# Patient Record
Sex: FEMALE | Race: WHITE | HISPANIC OR LATINO | Employment: UNEMPLOYED | URBAN - METROPOLITAN AREA
[De-identification: names, ages, dates, MRNs, and addresses within clinical notes are randomized per-mention and may not be internally consistent; named-entity substitution may affect disease eponyms.]

---

## 2018-06-06 ENCOUNTER — HOSPITAL ENCOUNTER (EMERGENCY)
Facility: HOSPITAL | Age: 68
Discharge: HOME | End: 2018-06-06
Attending: EMERGENCY MEDICINE
Payer: MEDICARE

## 2018-06-06 VITALS
HEART RATE: 76 BPM | HEIGHT: 65 IN | DIASTOLIC BLOOD PRESSURE: 83 MMHG | OXYGEN SATURATION: 97 % | WEIGHT: 150 LBS | BODY MASS INDEX: 24.99 KG/M2 | RESPIRATION RATE: 16 BRPM | TEMPERATURE: 99 F | SYSTOLIC BLOOD PRESSURE: 152 MMHG

## 2018-06-06 DIAGNOSIS — M54.50 ACUTE BILATERAL LOW BACK PAIN WITHOUT SCIATICA: Primary | ICD-10-CM

## 2018-06-06 LAB
BILIRUB UR QL STRIP: NEGATIVE
CLARITY UR: CLEAR
COLOR UR: YELLOW
GLUCOSE UR QL STRIP: NEGATIVE
HGB UR QL STRIP: NEGATIVE
KETONES UR QL STRIP: NEGATIVE
LEUKOCYTE ESTERASE UR QL STRIP: NEGATIVE
NITRITE UR QL STRIP: NEGATIVE
PH UR STRIP: 6 [PH] (ref 5–8)
PROT UR QL STRIP: NEGATIVE
SP GR UR STRIP: 1.02 (ref 1–1.03)
URN SPEC COLLECT METH UR: NORMAL
UROBILINOGEN UR STRIP-ACNC: NEGATIVE EU/DL

## 2018-06-06 PROCEDURE — 96372 THER/PROPH/DIAG INJ SC/IM: CPT

## 2018-06-06 PROCEDURE — 99283 EMERGENCY DEPT VISIT LOW MDM: CPT | Mod: 25

## 2018-06-06 PROCEDURE — 63600175 PHARM REV CODE 636 W HCPCS: Performed by: EMERGENCY MEDICINE

## 2018-06-06 PROCEDURE — 81003 URINALYSIS AUTO W/O SCOPE: CPT

## 2018-06-06 RX ORDER — ORPHENADRINE CITRATE 30 MG/ML
60 INJECTION INTRAMUSCULAR; INTRAVENOUS
Status: COMPLETED | OUTPATIENT
Start: 2018-06-06 | End: 2018-06-06

## 2018-06-06 RX ORDER — CYCLOBENZAPRINE HCL 10 MG
10 TABLET ORAL 3 TIMES DAILY PRN
Qty: 14 TABLET | Refills: 0 | Status: SHIPPED | OUTPATIENT
Start: 2018-06-06 | End: 2018-06-11

## 2018-06-06 RX ORDER — KETOROLAC TROMETHAMINE 30 MG/ML
30 INJECTION, SOLUTION INTRAMUSCULAR; INTRAVENOUS
Status: COMPLETED | OUTPATIENT
Start: 2018-06-06 | End: 2018-06-06

## 2018-06-06 RX ADMIN — KETOROLAC TROMETHAMINE 30 MG: 30 INJECTION, SOLUTION INTRAMUSCULAR at 11:06

## 2018-06-06 RX ADMIN — ORPHENADRINE CITRATE 60 MG: 30 INJECTION INTRAMUSCULAR; INTRAVENOUS at 11:06

## 2018-06-07 NOTE — DISCHARGE INSTRUCTIONS
In the morning, I recommend you begin taking ibuprofen 600mg every 8 hours with food until your symptoms improve.

## 2018-06-07 NOTE — ED PROVIDER NOTES
Encounter Date: 6/6/2018       History     Chief Complaint   Patient presents with    Back Pain     onset 5 days after pushing a box ambulatory to [facility]     68-year-old female presents emergency department complaining of several days of low back pain. She reports an aching, constant low back pain that started about 4 5 days ago when she was pushing something.  States she believes she pulled a muscle.  Reports the pain is constant but worse with certain movements and positions and without alleviating factors.  Denies any focal numbness or weakness or loss of continence or difficulty urinating or defecating.  No recent falls reported.  No other symptoms reported.          Review of patient's allergies indicates:  No Known Allergies  Past Medical History:   Diagnosis Date    Hypertension     Kidney stones      Past Surgical History:   Procedure Laterality Date    HYSTERECTOMY       Family History   Problem Relation Age of Onset    Diabetes Mother     Heart disease Father     Hypertension Sister     Hypertension Brother     Cancer Maternal Grandfather      Social History   Substance Use Topics    Smoking status: Never Smoker    Smokeless tobacco: Not on file    Alcohol use No     Review of Systems   Constitutional: Negative for chills, fatigue and fever.   HENT: Negative for congestion, sore throat and voice change.    Eyes: Negative for photophobia, pain and redness.   Respiratory: Negative for cough, choking and shortness of breath.    Cardiovascular: Negative for chest pain, palpitations and leg swelling.   Gastrointestinal: Negative for abdominal pain, diarrhea, nausea and vomiting.   Genitourinary: Negative for dysuria, frequency and urgency.   Musculoskeletal: Positive for back pain. Negative for neck pain and neck stiffness.   Neurological: Negative for seizures, speech difficulty, light-headedness, numbness and headaches.   All other systems reviewed and are negative.      Physical Exam     Initial  Vitals [06/06/18 2219]   BP Pulse Resp Temp SpO2   (!) 152/83 76 16 98.8 °F (37.1 °C) 97 %      MAP       106         Physical Exam    Nursing note and vitals reviewed.  Constitutional: She appears well-developed and well-nourished. She appears distressed (Mild discomfort).   HENT:   Head: Normocephalic and atraumatic.   Mouth/Throat: Oropharynx is clear and moist.   Eyes: Conjunctivae and EOM are normal. Pupils are equal, round, and reactive to light.   Neck: Normal range of motion. Neck supple. No tracheal deviation present.   Cardiovascular: Normal rate, regular rhythm, normal heart sounds and intact distal pulses.   Pulmonary/Chest: Breath sounds normal. No respiratory distress. She has no wheezes. She has no rhonchi. She has no rales.   Abdominal: Soft. Bowel sounds are normal. She exhibits no distension. There is no tenderness.   Musculoskeletal: Normal range of motion. She exhibits tenderness (Mild low back tenderness diffusely; no point or bony tenderness). She exhibits no edema.   Neurological: She is alert and oriented to person, place, and time. She has normal strength. No cranial nerve deficit or sensory deficit.   Skin: Skin is warm and dry. Capillary refill takes less than 2 seconds.         ED Course   Procedures  Labs Reviewed   URINALYSIS          No orders to display        Medical Decision Making:   Initial Assessment:   68-year-old female presents emergency department complaining of low back pain for several days after pushing something heavy  Differential Diagnosis:   UTI, pyelonephritis, fracture, dislocation, sprain, strain, muscle spasm  Clinical Tests:   Lab Tests: Reviewed       <> Summary of Lab: Benign  ED Management:  Patient given IM Toradol and Norflex.  She reports improvement in her symptoms at this time.  We discussed disposition including discharged with a prescription for Flexeril, instructions to take ibuprofen 600 mg every 8 hr with food until her symptoms improve, reasons to  return to the emergency room, instructions to follow up with her primary care physician.  Patient expressed good understanding and is comfortable with discharge at this time.                      Clinical Impression:   The encounter diagnosis was Acute bilateral low back pain without sciatica.      Disposition:   Disposition: Discharged  Condition: Stable                        Cosme Fan MD  06/06/18 1545

## 2018-06-07 NOTE — ED NOTES
No signs or symptoms from IM medication administration.   Rates pain 6/10 at this time. Ambulatory. NAD noted.

## 2018-06-07 NOTE — ED NOTES
"Pt presents to ED secondary to back pain x5 days s/p pushing "a heavy box." Denies urinary symptoms. NAD noted.   "

## 2024-11-11 DIAGNOSIS — M25.562 LEFT KNEE PAIN, UNSPECIFIED CHRONICITY: Primary | ICD-10-CM

## 2024-11-12 ENCOUNTER — OFFICE VISIT (OUTPATIENT)
Dept: ORTHOPEDICS | Facility: CLINIC | Age: 74
End: 2024-11-12
Payer: MEDICARE

## 2024-11-12 ENCOUNTER — HOSPITAL ENCOUNTER (OUTPATIENT)
Dept: RADIOLOGY | Facility: HOSPITAL | Age: 74
Discharge: HOME | End: 2024-11-12
Attending: ORTHOPAEDIC SURGERY
Payer: MEDICARE

## 2024-11-12 VITALS — HEIGHT: 65 IN | WEIGHT: 149.94 LBS | BODY MASS INDEX: 24.98 KG/M2

## 2024-11-12 DIAGNOSIS — M25.562 LEFT KNEE PAIN, UNSPECIFIED CHRONICITY: ICD-10-CM

## 2024-11-12 DIAGNOSIS — M62.81 QUADRICEPS WEAKNESS: ICD-10-CM

## 2024-11-12 DIAGNOSIS — M25.562 ACUTE PAIN OF LEFT KNEE: Primary | ICD-10-CM

## 2024-11-12 PROCEDURE — 1159F MED LIST DOCD IN RCRD: CPT | Mod: CPTII,S$GLB,, | Performed by: ORTHOPAEDIC SURGERY

## 2024-11-12 PROCEDURE — 3008F BODY MASS INDEX DOCD: CPT | Mod: CPTII,S$GLB,, | Performed by: ORTHOPAEDIC SURGERY

## 2024-11-12 PROCEDURE — 99999 PR PBB SHADOW E&M-EST. PATIENT-LVL III: CPT | Mod: PBBFAC,,, | Performed by: ORTHOPAEDIC SURGERY

## 2024-11-12 PROCEDURE — 99204 OFFICE O/P NEW MOD 45 MIN: CPT | Mod: 25,GC,S$GLB, | Performed by: ORTHOPAEDIC SURGERY

## 2024-11-12 PROCEDURE — 20610 DRAIN/INJ JOINT/BURSA W/O US: CPT | Mod: LT,S$GLB,, | Performed by: ORTHOPAEDIC SURGERY

## 2024-11-12 PROCEDURE — 1101F PT FALLS ASSESS-DOCD LE1/YR: CPT | Mod: CPTII,S$GLB,, | Performed by: ORTHOPAEDIC SURGERY

## 2024-11-12 PROCEDURE — 4010F ACE/ARB THERAPY RXD/TAKEN: CPT | Mod: CPTII,S$GLB,, | Performed by: ORTHOPAEDIC SURGERY

## 2024-11-12 PROCEDURE — 3288F FALL RISK ASSESSMENT DOCD: CPT | Mod: CPTII,S$GLB,, | Performed by: ORTHOPAEDIC SURGERY

## 2024-11-12 PROCEDURE — 73564 X-RAY EXAM KNEE 4 OR MORE: CPT | Mod: 26,LT,, | Performed by: RADIOLOGY

## 2024-11-12 PROCEDURE — 73564 X-RAY EXAM KNEE 4 OR MORE: CPT | Mod: TC,PN,LT

## 2024-11-12 RX ORDER — KETOROLAC TROMETHAMINE 30 MG/ML
30 INJECTION, SOLUTION INTRAMUSCULAR; INTRAVENOUS
Status: DISCONTINUED | OUTPATIENT
Start: 2024-11-12 | End: 2024-11-12 | Stop reason: HOSPADM

## 2024-11-12 RX ORDER — BUPIVACAINE HYDROCHLORIDE 5 MG/ML
5 INJECTION, SOLUTION PERINEURAL
Status: DISCONTINUED | OUTPATIENT
Start: 2024-11-12 | End: 2024-11-12 | Stop reason: HOSPADM

## 2024-11-12 RX ORDER — LIDOCAINE HYDROCHLORIDE 10 MG/ML
4 INJECTION, SOLUTION INFILTRATION; PERINEURAL
Status: DISCONTINUED | OUTPATIENT
Start: 2024-11-12 | End: 2024-11-12 | Stop reason: HOSPADM

## 2024-11-12 RX ADMIN — KETOROLAC TROMETHAMINE 30 MG: 30 INJECTION, SOLUTION INTRAMUSCULAR; INTRAVENOUS at 01:11

## 2024-11-12 RX ADMIN — LIDOCAINE HYDROCHLORIDE 4 ML: 10 INJECTION, SOLUTION INFILTRATION; PERINEURAL at 01:11

## 2024-11-12 RX ADMIN — BUPIVACAINE HYDROCHLORIDE 5 ML: 5 INJECTION, SOLUTION PERINEURAL at 01:11

## 2024-11-12 NOTE — PROCEDURES
Large Joint Aspiration/Injection: L knee    Date/Time: 11/12/2024 1:15 PM    Performed by: Ruiz Live MD  Authorized by: Ruiz Live MD    Consent Done?:  Yes (Verbal)  Indications:  Arthritis  Site marked: the procedure site was marked    Timeout: prior to procedure the correct patient, procedure, and site was verified    Prep: patient was prepped and draped in usual sterile fashion      Local anesthesia used?: Yes    Local anesthetic:  Topical anesthetic    Details:  Needle Size:  22 G  Ultrasonic Guidance for needle placement?: No    Approach:  Anterolateral  Location:  Knee  Site:  L knee  Medications:  30 mg ketorolac 30 mg/mL (1 mL); 5 mL BUPivacaine 0.5 % (5 mg/mL); 4 mL LIDOcaine HCL 10 mg/ml (1%) 10 mg/mL (1 %)  Patient tolerance:  Patient tolerated the procedure well with no immediate complications

## 2024-11-12 NOTE — PROGRESS NOTES
[facility] [address]          Subjective:     Patient ID: Shobha Gutierrez is a 74 y.o. female.    Chief Complaint: Pain of the Left Knee       Patient ID: Shobha Gutierrez is a 74 y.o. female.    Chief Complaint: Pain of the Left Knee      KNEE PAIN: Complains of pain to the leftknee.   PAIN LOCATED: medial  ONSET: 6 weeks ago.  QUALITY:  Patient states the pain is stable  HISTORY: Previous knee injury/surgery: Yes  Hx:  Patient reports tripping while walking dog 6 weeks ago falling and twisting left knee.  Reports pain and swelling afterwards.  Patient reports she went to her PCP 2 weeks later who gave patient intra-articular steroid injection with relief for a couple of days.  Denies any history of left knee pain or stiffness prior to injury  ASSOCIATED SYMPTOM AND TRIGGERS:Standing/Weightbearing, walking, trouble w stairs, stiffness, swelling, limping, stiffness w sitting  and Knee gives way  Has tried and failed cardiovascular activities such as walking, biking and resistance exercises  USES ASSISTIVE DEVICE: none  RELIEVED BY:rest, medication: Tylenol used but not effective  PATIENT DENIES: bruising, redness, deformity        Past Medical History:   Diagnosis Date    Hypertension     Kidney stones         Past Surgical History:   Procedure Laterality Date    HYSTERECTOMY          Current Outpatient Medications   Medication Instructions    benazepriL (LOTENSIN) 20 mg, Daily    cyclobenzaprine (FLEXERIL) 5 mg, Daily PRN    hydrocodone-acetaminophen (LORTAB)  mg per tablet 1 tablet, Oral, Every 4 hours PRN    peg 3350-electrolytes-vit C (MOVIPREP) 100-7.5-2.691 gram PwPk As Directed        Review of patient's allergies indicates:  No Known Allergies    Social History     Socioeconomic History    Marital status:    Tobacco Use    Smoking status: Never   Substance and Sexual Activity    Alcohol use: No    Drug use: No     Social Drivers of Health     Financial Resource Strain: Low Risk  (4/24/2023)     Received from [facility]    Overall Financial Resource Strain (CARDIA)     Difficulty of Paying Living Expenses: Not hard at all   Food Insecurity: No Food Insecurity (4/24/2023)    Received from [facility]    Hunger Vital Sign     Worried About Running Out of Food in the Last Year: Never true     Ran Out of Food in the Last Year: Never true   Transportation Needs: No Transportation Needs (4/24/2023)    Received from [facility]    PRAPARE - Transportation     Lack of Transportation (Medical): No     Lack of Transportation (Non-Medical): No   Physical Activity: Unknown (4/24/2023)    Received from [facility]    Exercise Vital Sign     Days of Exercise per Week: 0 days   Stress: Stress Concern Present (4/24/2023)    Received from [facility]    [place] [employer] - Occupational Stress Questionnaire     Feeling of Stress : Very much   Housing Stability: Unknown (4/24/2023)    Received from [facility]    Housing Stability Vital Sign     Unable to Pay for Housing in the Last Year: No     Unstable Housing in the Last Year: No       Family History   Problem Relation Name Age of Onset    Diabetes Mother      Heart disease Father      Hypertension Sister      Hypertension Brother      Cancer Maternal Grandfather           Review of systems negative except for noted in HPI    Objective:   Physical Exam:     left knee  Skin atraumatic   + effusion  Tender to palpation over medial joint line  ROM 0-115 compared to 130 contralateral  Positive Lisa's  Stable anterior/posterior   Stable varus/valgus  No groin pain with rotation of hip  Grossly NVI distally    Imaging:   X-Ray knee reviewed, KL 3 changes with joint space narrowing and osteophytosis.      Assessment:        Shobha Gutierrez is a 74 y.o. female concern for left knee meniscal pathology    Encounter Diagnoses   Name Primary?    Acute pain of left knee Yes    Quadriceps weakness        Plan :  We had a lengthy discussion regarding the  natural history of osteoarthritis and concern for possible meniscus pathology.   The patient would like to continue nonoperative management, and I think that is Reasonable. The patient has mild/moderate knee oa. KL score 3  We went ahead and injected the left  with 1 dose of ketorolac, Marcaine, and lidocaine. We reviewed the risks (incl side effects and allergies), benefits, of all treatment options including injections.   we will try a course of PT before ordering an MRI. Patient should f/u with me after approx 1 mo of PT to consider an MRI at that point for possible meniscal pathology               Orders Placed This Encounter   Procedures    Large Joint Aspiration/Injection: L knee    Ambulatory referral/consult to Physical/Occupational Therapy          Luther Connolly MD  [employer] PGY-3

## 2024-12-12 ENCOUNTER — OFFICE VISIT (OUTPATIENT)
Dept: ORTHOPEDICS | Facility: CLINIC | Age: 74
End: 2024-12-12
Payer: MEDICARE

## 2024-12-12 VITALS — BODY MASS INDEX: 24.98 KG/M2 | HEIGHT: 65 IN | WEIGHT: 149.94 LBS

## 2024-12-12 DIAGNOSIS — M25.562 ACUTE PAIN OF LEFT KNEE: ICD-10-CM

## 2024-12-12 DIAGNOSIS — M25.462 EFFUSION OF LEFT KNEE: ICD-10-CM

## 2024-12-12 DIAGNOSIS — M17.10 UNILATERAL PRIMARY OSTEOARTHRITIS, UNSPECIFIED KNEE: Primary | ICD-10-CM

## 2024-12-12 PROCEDURE — 99213 OFFICE O/P EST LOW 20 MIN: CPT | Mod: S$GLB,,, | Performed by: ORTHOPAEDIC SURGERY

## 2024-12-12 PROCEDURE — G2211 COMPLEX E/M VISIT ADD ON: HCPCS | Mod: S$GLB,,, | Performed by: ORTHOPAEDIC SURGERY

## 2024-12-12 PROCEDURE — 1159F MED LIST DOCD IN RCRD: CPT | Mod: CPTII,S$GLB,, | Performed by: ORTHOPAEDIC SURGERY

## 2024-12-12 PROCEDURE — 1101F PT FALLS ASSESS-DOCD LE1/YR: CPT | Mod: CPTII,S$GLB,, | Performed by: ORTHOPAEDIC SURGERY

## 2024-12-12 PROCEDURE — 4010F ACE/ARB THERAPY RXD/TAKEN: CPT | Mod: CPTII,S$GLB,, | Performed by: ORTHOPAEDIC SURGERY

## 2024-12-12 PROCEDURE — 3288F FALL RISK ASSESSMENT DOCD: CPT | Mod: CPTII,S$GLB,, | Performed by: ORTHOPAEDIC SURGERY

## 2024-12-12 PROCEDURE — 3008F BODY MASS INDEX DOCD: CPT | Mod: CPTII,S$GLB,, | Performed by: ORTHOPAEDIC SURGERY

## 2024-12-12 PROCEDURE — 99999 PR PBB SHADOW E&M-EST. PATIENT-LVL III: CPT | Mod: PBBFAC,,, | Performed by: ORTHOPAEDIC SURGERY

## 2024-12-12 PROCEDURE — 1125F AMNT PAIN NOTED PAIN PRSNT: CPT | Mod: CPTII,S$GLB,, | Performed by: ORTHOPAEDIC SURGERY

## 2024-12-12 NOTE — PROGRESS NOTES
Subjective:      Patient ID: Shobha Gutierrez is a 74 y.o. female.    Chief Complaint: Pain of the Left Knee    Knee Pain: left  swelling: Yes  worsens with activity: Yes  relieved by: injections, 1 mo  No improvement w home exercises           Social History     Occupational History    Not on file   Tobacco Use    Smoking status: Never    Smokeless tobacco: Not on file   Substance and Sexual Activity    Alcohol use: No    Drug use: No    Sexual activity: Not on file      Social Drivers of Health     Tobacco Use: Unknown (12/12/2024)    Patient History     Smoking Tobacco Use: Never     Smokeless Tobacco Use: Unknown     Passive Exposure: Not on file   Alcohol Use: Not At Risk (4/24/2023)    Received from [facility]    AUDIT-C     Frequency of Alcohol Consumption: Never     Average Number of Drinks: Not on file     Frequency of Binge Drinking: Not on file   Financial Resource Strain: Low Risk  (4/24/2023)    Received from [facility]    Overall Financial Resource Strain (CARDIA)     Difficulty of Paying Living Expenses: Not hard at all   Food Insecurity: No Food Insecurity (4/24/2023)    Received from [facility]    Hunger Vital Sign     Worried About Running Out of Food in the Last Year: Never true     Ran Out of Food in the Last Year: Never true   Transportation Needs: No Transportation Needs (4/24/2023)    Received from [facility]    PRAPARE - Transportation     Lack of Transportation (Medical): No     Lack of Transportation (Non-Medical): No   Physical Activity: Unknown (4/24/2023)    Received from [facility]    Exercise Vital Sign     Days of Exercise per Week: 0 days     Minutes of Exercise per Session: Not on file   Stress: Stress Concern Present (4/24/2023)    Received from [facility]    [place] [place] [employer] - Occupational Stress Questionnaire     Feeling of Stress : Very much   Housing Stability: Unknown (4/24/2023)    Received from [facility]    Housing Stability Vital Sign      Unable to Pay for Housing in the Last Year: No     Number of Places Lived in the Last Year: Not on file     Unstable Housing in the Last Year: No   Depression: Moderately severe depression (2023)    Received from [facility]    PHQ-9     PHQ-9 Total Score - If Score > 5, Proceed to Suicide Risk Screenin   Utilities: Not on file   Health Literacy: Not on file   Social Isolation: Not on file      Review of Systems   Constitutional: Negative for diaphoresis.   HENT:  Negative for ear discharge, nosebleeds and stridor.    Eyes:  Negative for photophobia.   Cardiovascular:  Negative for syncope.   Respiratory:  Negative for hemoptysis, shortness of breath and wheezing.    Neurological:  Negative for tremors.   Psychiatric/Behavioral: Negative.           Objective:    General    Constitutional: She is oriented to person, place, and time. She appears well-developed and well-nourished.   HENT:   Head: Normocephalic and atraumatic.   Right Ear: External ear normal.   Left Ear: External ear normal.   Eyes: EOM are normal.   Pulmonary/Chest: Effort normal.   Neurological: She is alert and oriented to person, place, and time.   Psychiatric: She has a normal mood and affect. Her behavior is normal. Judgment and thought content normal.     General Musculoskeletal Exam   Gait: antalgic and abnormal         Left Knee Exam     Inspection   Swelling: present  Effusion: present    Tenderness   The patient tender to palpation of the medial joint line.    Crepitus   The patient has crepitus of the patella.    Other   Sensation: normal    Muscle Strength   Left Lower Extremity   Quadriceps:  4/5   Hamstrin/5          Assessment:       1. Unilateral primary osteoarthritis, unspecified knee    2. Effusion of left knee    3. Acute pain of left knee          Plan:   I had a lengthy discussion with the patient regarding various sources of knee pain.  The patient is receiving minimal relief with NSAIDs, injections, and have  failed physical therapy/home exercise. At this point, given the acuity of the symptoms, mechanical in nature, physical examination and degenerative findings on radiographs, I think an MRI is warranted to evaluate for meniscus tear. We will try to help arrange this. I will see the patient back once the MRI is complete.

## 2024-12-18 ENCOUNTER — HOSPITAL ENCOUNTER (OUTPATIENT)
Dept: RADIOLOGY | Facility: HOSPITAL | Age: 74
Discharge: HOME | End: 2024-12-18
Attending: ORTHOPAEDIC SURGERY
Payer: MEDICARE

## 2024-12-18 DIAGNOSIS — M25.462 EFFUSION OF LEFT KNEE: ICD-10-CM

## 2024-12-18 DIAGNOSIS — M17.10 UNILATERAL PRIMARY OSTEOARTHRITIS, UNSPECIFIED KNEE: ICD-10-CM

## 2024-12-18 DIAGNOSIS — M25.562 ACUTE PAIN OF LEFT KNEE: ICD-10-CM

## 2024-12-18 PROCEDURE — 73721 MRI JNT OF LWR EXTRE W/O DYE: CPT | Mod: 26,LT,, | Performed by: RADIOLOGY

## 2024-12-18 PROCEDURE — 73721 MRI JNT OF LWR EXTRE W/O DYE: CPT | Mod: TC,LT

## 2025-01-09 ENCOUNTER — HOSPITAL ENCOUNTER (OUTPATIENT)
Dept: RADIOLOGY | Facility: HOSPITAL | Age: 75
Discharge: HOME | End: 2025-01-09
Attending: ORTHOPAEDIC SURGERY
Payer: MEDICARE

## 2025-01-09 ENCOUNTER — OFFICE VISIT (OUTPATIENT)
Dept: ORTHOPEDICS | Facility: CLINIC | Age: 75
End: 2025-01-09
Payer: MEDICARE

## 2025-01-09 ENCOUNTER — CLINICAL SUPPORT (OUTPATIENT)
Dept: LAB | Facility: HOSPITAL | Age: 75
End: 2025-01-09
Attending: ORTHOPAEDIC SURGERY
Payer: MEDICARE

## 2025-01-09 VITALS — BODY MASS INDEX: 24.98 KG/M2 | WEIGHT: 149.94 LBS | HEIGHT: 65 IN

## 2025-01-09 DIAGNOSIS — M25.562 ACUTE PAIN OF LEFT KNEE: ICD-10-CM

## 2025-01-09 DIAGNOSIS — S83.242D TEAR OF MEDIAL MENISCUS OF LEFT KNEE, SUBSEQUENT ENCOUNTER: Primary | ICD-10-CM

## 2025-01-09 DIAGNOSIS — S83.242D TEAR OF MEDIAL MENISCUS OF LEFT KNEE, SUBSEQUENT ENCOUNTER: ICD-10-CM

## 2025-01-09 LAB
OHS QRS DURATION: 86 MS
OHS QTC CALCULATION: 469 MS

## 2025-01-09 PROCEDURE — 99215 OFFICE O/P EST HI 40 MIN: CPT | Mod: S$PBB,,, | Performed by: ORTHOPAEDIC SURGERY

## 2025-01-09 PROCEDURE — 93010 ELECTROCARDIOGRAM REPORT: CPT | Mod: ,,, | Performed by: STUDENT IN AN ORGANIZED HEALTH CARE EDUCATION/TRAINING PROGRAM

## 2025-01-09 PROCEDURE — 99214 OFFICE O/P EST MOD 30 MIN: CPT | Mod: PBBFAC,PN | Performed by: ORTHOPAEDIC SURGERY

## 2025-01-09 PROCEDURE — G2211 COMPLEX E/M VISIT ADD ON: HCPCS | Mod: S$PBB,,, | Performed by: ORTHOPAEDIC SURGERY

## 2025-01-09 PROCEDURE — 99999 PR PBB SHADOW E&M-EST. PATIENT-LVL IV: CPT | Mod: PBBFAC,,, | Performed by: ORTHOPAEDIC SURGERY

## 2025-01-09 PROCEDURE — 93005 ELECTROCARDIOGRAM TRACING: CPT

## 2025-01-09 NOTE — PROGRESS NOTES
Subjective:      Patient ID: Shobha Gutierrez is a 74 y.o. female.    Chief Complaint: Pain of the Left Knee    Knee Pain: left  swelling: Yes  worsens with activity: Yes  relieved by: nsaid's  Mri shows men tear           Social History     Occupational History    Not on file   Tobacco Use    Smoking status: Never    Smokeless tobacco: Not on file   Substance and Sexual Activity    Alcohol use: No    Drug use: No    Sexual activity: Not on file      Social Drivers of Health     Tobacco Use: Unknown (1/9/2025)    Patient History     Smoking Tobacco Use: Never     Smokeless Tobacco Use: Unknown     Passive Exposure: Not on file   Alcohol Use: Not At Risk (4/24/2023)    Received from [facility]    AUDIT-C     Frequency of Alcohol Consumption: Never     Average Number of Drinks: Not on file     Frequency of Binge Drinking: Not on file   Financial Resource Strain: Low Risk  (4/24/2023)    Received from [facility]    Overall Financial Resource Strain (CARDIA)     Difficulty of Paying Living Expenses: Not hard at all   Food Insecurity: No Food Insecurity (4/24/2023)    Received from [facility]    Hunger Vital Sign     Worried About Running Out of Food in the Last Year: Never true     Ran Out of Food in the Last Year: Never true   Transportation Needs: No Transportation Needs (4/24/2023)    Received from [facility]    PRAPARE - Transportation     Lack of Transportation (Medical): No     Lack of Transportation (Non-Medical): No   Physical Activity: Unknown (4/24/2023)    Received from [facility]    Exercise Vital Sign     Days of Exercise per Week: 0 days     Minutes of Exercise per Session: Not on file   Stress: Stress Concern Present (4/24/2023)    Received from [facility]    [place] [employer] - Occupational Stress Questionnaire     Feeling of Stress : Very much   Housing Stability: High Risk (5/31/2023)    Received from [facility]     Think about the place you live. Do you have problems  with:  Choose all that apply.: Mold     What is your living situation today?: I have a steady place to live   Depression: Moderately severe depression (2023)    Received from [facility]    PHQ-9     PHQ-9 Total Score - If Score > 5, Proceed to Suicide Risk Screenin   Utilities: Not on file   Health Literacy: Not on file   Social Isolation: Not on file      Review of Systems   Constitutional: Negative for diaphoresis.   HENT:  Negative for ear discharge, nosebleeds and stridor.    Eyes:  Negative for photophobia.   Cardiovascular:  Negative for syncope.   Respiratory:  Negative for hemoptysis, shortness of breath and wheezing.    Neurological:  Negative for tremors.   Psychiatric/Behavioral: Negative.           Objective:    General    Constitutional: She is oriented to person, place, and time. She appears well-developed and well-nourished.   HENT:   Head: Normocephalic and atraumatic.   Right Ear: External ear normal.   Left Ear: External ear normal.   Eyes: EOM are normal.   Pulmonary/Chest: Effort normal.   Neurological: She is alert and oriented to person, place, and time.   Psychiatric: She has a normal mood and affect. Her behavior is normal. Judgment and thought content normal.     General Musculoskeletal Exam   Gait: antalgic and abnormal         Left Knee Exam     Inspection   Swelling: present  Effusion: present    Tenderness   The patient tender to palpation of the medial joint line.    Crepitus   The patient has crepitus of the patella.    Other   Sensation: normal    Muscle Strength   Left Lower Extremity   Quadriceps:  4/5   Hamstrin/5          Assessment:       1. Tear of medial meniscus of left knee, subsequent encounter    2. Acute pain of left knee          Plan:   The patient presents today for followup. MRI revealed a tear of the meniscus as well as some degenerative arthritis of the Left knee. AP knee Xray shows >50% maintenance of joint space (ie KL 2). We discussed these  findings with the patient. We did discuss arthroscopy and the fact that its role would hopefully help the symptoms related to meniscal tear; however, would not necessarily address any symptoms related to degenerative arthritis. The patient was also offered a course of PT first but would rather move forward with arthroscopy. The patient understands this and would like to move forward. I think that is reasonable. We reviewed risks and benefits of surgery. We also reviewed the role of physical therapy vs arthroscopy. Research indicates that approx 9 mos of PT will achieve similar results to the near immediate improvement with arthroscopy. The patient would like the faster improvement compared to the delayed improvement with PT. We will go ahead and schedule this at a time that is convenient for the patient.

## 2025-01-27 ENCOUNTER — HOSPITAL ENCOUNTER (OUTPATIENT)
Dept: RADIOLOGY | Facility: HOSPITAL | Age: 75
Discharge: HOME | End: 2025-01-27
Attending: ORTHOPAEDIC SURGERY
Payer: MEDICARE

## 2025-01-27 PROCEDURE — 71045 X-RAY EXAM CHEST 1 VIEW: CPT | Mod: TC,PN

## 2025-01-27 PROCEDURE — 71045 X-RAY EXAM CHEST 1 VIEW: CPT | Mod: 26,,, | Performed by: RADIOLOGY

## 2025-01-31 ENCOUNTER — ANESTHESIA EVENT (OUTPATIENT)
Dept: SURGERY | Facility: HOSPITAL | Age: 75
End: 2025-01-31
Payer: MEDICARE

## 2025-01-31 PROBLEM — E78.5 HYPERLIPIDEMIA: Status: ACTIVE | Noted: 2022-11-02

## 2025-01-31 PROBLEM — G47.00 INSOMNIA: Status: ACTIVE | Noted: 2023-05-04

## 2025-01-31 PROBLEM — M15.0 PRIMARY OSTEOARTHRITIS INVOLVING MULTIPLE JOINTS: Status: ACTIVE | Noted: 2023-08-01

## 2025-01-31 PROBLEM — K21.9 GASTROESOPHAGEAL REFLUX DISEASE WITHOUT ESOPHAGITIS: Status: ACTIVE | Noted: 2023-01-29

## 2025-01-31 PROBLEM — M25.511 CHRONIC RIGHT SHOULDER PAIN: Status: ACTIVE | Noted: 2023-08-01

## 2025-01-31 PROBLEM — I25.10 CORONARY ARTERY DISEASE INVOLVING NATIVE CORONARY ARTERY OF NATIVE HEART WITHOUT ANGINA PECTORIS: Status: ACTIVE | Noted: 2022-11-02

## 2025-01-31 PROBLEM — G89.29 CHRONIC RIGHT SHOULDER PAIN: Status: ACTIVE | Noted: 2023-08-01

## 2025-01-31 PROBLEM — K59.00 CONSTIPATION: Status: ACTIVE | Noted: 2023-08-01

## 2025-01-31 PROBLEM — M81.0 AGE-RELATED OSTEOPOROSIS WITHOUT CURRENT PATHOLOGICAL FRACTURE: Status: ACTIVE | Noted: 2023-08-01

## 2025-01-31 PROBLEM — E55.9 VITAMIN D DEFICIENCY: Status: ACTIVE | Noted: 2023-08-01

## 2025-01-31 PROBLEM — M79.7 FIBROMYALGIA: Status: ACTIVE | Noted: 2025-01-31

## 2025-01-31 RX ORDER — ALBUTEROL SULFATE 90 UG/1
2 INHALANT RESPIRATORY (INHALATION) 4 TIMES DAILY PRN
COMMUNITY
Start: 2024-11-12

## 2025-01-31 RX ORDER — LISINOPRIL 10 MG/1
10 TABLET ORAL DAILY
COMMUNITY
Start: 2024-10-15

## 2025-01-31 RX ORDER — AMITRIPTYLINE HYDROCHLORIDE 25 MG/1
25 TABLET, FILM COATED ORAL NIGHTLY PRN
COMMUNITY
Start: 2025-01-11

## 2025-01-31 RX ORDER — AMLODIPINE BESYLATE 10 MG/1
10 TABLET ORAL DAILY
COMMUNITY
Start: 2024-11-25

## 2025-01-31 RX ORDER — PANTOPRAZOLE SODIUM 40 MG/1
40 TABLET, DELAYED RELEASE ORAL DAILY
COMMUNITY

## 2025-01-31 RX ORDER — PHENAZOPYRIDINE HYDROCHLORIDE 200 MG/1
200 TABLET, FILM COATED ORAL 3 TIMES DAILY
COMMUNITY
Start: 2024-11-05

## 2025-01-31 NOTE — ANESTHESIA PREPROCEDURE EVALUATION
[facility]  Anesthesia Pre-Operative Evaluation         Patient Name: Shobha Gutierrez  YOB: 1950  MRN: [id number]    SUBJECTIVE:     Pre-operative evaluation for Procedure(s) (LRB):  ARTHROSCOPY, KNEE, WITH MEDIAL OR LATERAL MENISCECTOMY (Left)     02/10/2025    Shobha Gutierrez is a 74 y.o. female w/ a significant PMHx of HTN, CAD, and nephrolithiasis who presents for treatment of L knee pain.    Patient now presents for the above procedure(s).    Echo Summary  No results found for this or any previous visit.       Prev airway: None documented.    LDA: None documented.       Drips: None documented.      Patient Active Problem List   Diagnosis    Hypertension    Obesity    LLQ abdominal pain    Nephrolithiasis    Ischemic colitis    Quadriceps weakness    Acute pain of left knee    Tear of medial meniscus of left knee, subsequent encounter    Age-related osteoporosis without current pathological fracture    Coronary artery disease involving native coronary artery of native heart without angina pectoris    Chronic right shoulder pain    Fibromyalgia    Gastroesophageal reflux disease without esophagitis    Hyperlipidemia    Insomnia    Primary osteoarthritis involving multiple joints    Vitamin D deficiency    Constipation       Review of patient's allergies indicates:  No Known Allergies    Current Inpatient Medications:   ceFAZolin (Ancef) IV (PEDS and ADULTS)  2 g Intravenous Once       No current facility-administered medications on file prior to encounter.     Current Outpatient Medications on File Prior to Encounter   Medication Sig Dispense Refill    albuterol (PROVENTIL/VENTOLIN HFA) 90 mcg/actuation inhaler 2 puffs 4 (four) times daily as needed for Shortness of Breath or Wheezing.      amitriptyline (ELAVIL) 25 MG tablet Take 25 mg by mouth nightly as needed.      amLODIPine (NORVASC) 10 MG tablet Take 10 mg by mouth once daily.      lisinopriL 10 MG  tablet Take 10 mg by mouth once daily.      phenazopyridine (PYRIDIUM) 200 MG tablet Take 200 mg by mouth 3 (three) times daily.      benazepril (LOTENSIN) 20 MG tablet Take 20 mg by mouth once daily.        cyclobenzaprine (FLEXERIL) 5 MG tablet Take 5 mg by mouth daily as needed.        hydrocodone-acetaminophen (LORTAB)  mg per tablet Take 1 tablet by mouth every 4 (four) hours as needed. 50 tablet 1    pantoprazole (PROTONIX) 40 MG tablet Take 40 mg by mouth once daily.      peg 3350-electrolytes-vit C (MOVIPREP) 100-7.5-2.691 gram PwPk As Directed 1 each 0       Past Surgical History:   Procedure Laterality Date    HYSTERECTOMY         Social History:  Tobacco Use: Unknown (1/9/2025)    Patient History     Smoking Tobacco Use: Never     Smokeless Tobacco Use: Unknown     Passive Exposure: Not on file      Alcohol Use: Not At Risk (4/24/2023)    Received from [facility]    AUDIT-C     Frequency of Alcohol Consumption: Never     Average Number of Drinks: Not on file     Frequency of Binge Drinking: Not on file        OBJECTIVE:     Vital Signs Range (Last 24H):         Significant Labs:  Lab Results   Component Value Date    WBC 7.19 01/09/2025    HGB 13.8 01/09/2025    HCT 42.7 01/09/2025     01/09/2025    CHOL 281 (H) 11/04/2022    TRIG 196 (H) 11/04/2022    HDL 63 11/04/2022    ALT 9 02/02/2024    AST 14 02/02/2024     01/09/2025    K 4.9 01/09/2025     01/09/2025    CREATININE 0.9 01/09/2025    BUN 20 01/09/2025    CO2 24 01/09/2025    HGBA1C 5.6 12/23/2012       Diagnostic Studies: No relevant studies.    EKG:   Results for orders placed or performed in visit on 01/09/25   EKG 12-lead    Collection Time: 01/09/25 11:24 AM   Result Value Ref Range    QRS Duration 86 ms    OHS QTC Calculation 469 ms    Narrative    Test Reason : S83.242D,M25.562,    Vent. Rate :  78 BPM     Atrial Rate :  78 BPM     P-R Int : 140 ms          QRS Dur :  86 ms      QT Int : 412 ms        P-R-T Axes :  51  15  63 degrees    QTcB Int : 469 ms    Normal sinus rhythm  Possible Left atrial enlargement  Nonspecific T wave abnormality  Abnormal ECG  When compared with ECG of 21-Dec-2012 23:37,  Nonspecific T wave abnormality now evident in Lateral leads  Confirmed by Ronn Diane ([id number]) on 1/9/2025 10:47:44 PM    Referred By: ZAK VILLALBA           Confirmed By: Ronn Russ       2D ECHO:  TTE:  No results found for this or any previous visit.    LUISANA:  No results found for this or any previous visit.    ASSESSMENT/PLAN:       Pre-op Assessment    I have reviewed the Patient Summary Reports.     I have reviewed the Nursing Notes. I have reviewed the NPO Status.   I have reviewed the Medications.     Review of Systems  Anesthesia Hx:  No problems with previous Anesthesia   History of prior surgery of interest to airway management or planning:  Previous anesthesia: General          Denies Personal Hx of Anesthesia complications.                    Social:  Non-Smoker, No Alcohol Use       Hematology/Oncology:                        --  Denies Cancer in past history:                     Cardiovascular:  Exercise tolerance: poor   Hypertension   CAD (2 stents)   CABG/stent     Denies CHF.    hyperlipidemia WOOD        Coronary Artery Disease:                            Hypertension         Pulmonary:    Denies COPD. Asthma mild                   Renal/:  Chronic Renal Disease renal calculi       Kidney Function/Disease             Hepatic/GI:     GERD, well controlled Denies Liver Disease.        Gerd          Musculoskeletal:  Arthritis        Arthritis          Neurological:    Neuromuscular Disease,           Arthritis                         Neuromuscular Disease   Endocrine:  Denies Diabetes.         Denies Obesity / BMI > 30      Physical Exam  General: Cooperative, Well nourished, Alert and Oriented    Airway:  Mallampati: III   Mouth Opening: Normal  TM Distance: Normal  Tongue:  Normal  Neck ROM: Normal ROM    Dental:  Dentures, Edentulous    Chest/Lungs:  Normal Respiratory Rate    Heart:  Rate: Normal    Lab Results   Component Value Date    WBC 7.19 01/09/2025    HGB 13.8 01/09/2025    HCT 42.7 01/09/2025     01/09/2025    CHOL 281 (H) 11/04/2022    TRIG 196 (H) 11/04/2022    HDL 63 11/04/2022    ALT 17 11/15/2023    AST 19 11/15/2023     01/09/2025    K 4.9 01/09/2025     01/09/2025    CREATININE 0.9 01/09/2025    BUN 20 01/09/2025    CO2 24 01/09/2025    HGBA1C 5.6 12/23/2012     Results for orders placed or performed in visit on 01/09/25   EKG 12-lead    Collection Time: 01/09/25 11:24 AM   Result Value Ref Range    QRS Duration 86 ms    OHS QTC Calculation 469 ms    Narrative    Test Reason : S83.242D,M25.562,    Vent. Rate :  78 BPM     Atrial Rate :  78 BPM     P-R Int : 140 ms          QRS Dur :  86 ms      QT Int : 412 ms       P-R-T Axes :  51  15  63 degrees    QTcB Int : 469 ms    Normal sinus rhythm  Possible Left atrial enlargement  Nonspecific T wave abnormality  Abnormal ECG  When compared with ECG of 21-Dec-2012 23:37,  Nonspecific T wave abnormality now evident in Lateral leads  Confirmed by Ronn Diane ([id number]) on 1/9/2025 10:47:44 PM    Referred By: ZAK VILLALBA           Confirmed By: Ronn Russ     X-Ray Chest 1 View Pre-OP  Narrative: EXAMINATION:  XR CHEST 1 VIEW PRE-OP    CLINICAL HISTORY:  Other tear of medial meniscus, current injury, left knee, subsequent encounter    TECHNIQUE:  Single frontal view of the chest was performed.    COMPARISON:  None.    FINDINGS:  Mediastinal structures are midline. Hilar contours are unremarkable.  Cardiac silhouette is normal in size. Lung volumes are normal and symmetric.  Subsegmental band like atelectasis projected over the left lower lung zone.  No consolidation.  No pneumothorax or pleural effusion.  No free air beneath the diaphragm. No acute osseous abnormalities.  Impression: Left  basilar subsegmental atelectasis.    Electronically signed by: Brad Durham MD  Date:    01/27/2025  Time:    11:22        Anesthesia Plan  Type of Anesthesia, risks & benefits discussed:    Anesthesia Type: MAC, Gen Supraglottic Airway, Gen ETT, Gen Natural Airway  Intra-op Monitoring Plan: Standard ASA Monitors  Post Op Pain Control Plan: multimodal analgesia and IV/PO Opioids PRN  Induction:  IV  Airway Plan: Video and Direct, Post-Induction  Informed Consent: Informed consent signed with the Patient and all parties understand the risks and agree with anesthesia plan.  All questions answered.   ASA Score: 3  Day of Surgery Review of History & Physical: H&P Update referred to the surgeon/provider.    Ready For Surgery From Anesthesia Perspective.     .

## 2025-02-06 NOTE — DISCHARGE INSTRUCTIONS
SEE PRESCRIPTION INSERTS FOR INFORMATION ON YOUR MEDICATIONS     Activity as tolerated   Ok to change dressing in 3-4 days   Ok to shower after 5 days   Follow up with Dr. Live in 2 weeks     ANESTHESIA  -For the first 24 hours after surgery:  Do not drive, use heavy equipment, make important decisions, or drink alcohol  -It is normal to feel sleepy for several hours.  Rest until you are more awake.  -Have someone stay with you, if needed.  They can watch for problems and help keep you safe.  -Some possible post anesthesia side effects include: nausea and vomiting, sore throat and hoarseness, sleepiness, and dizziness.    PAIN  -If you have pain after surgery, pain medicine will help you feel better.  Take it as directed, before pain becomes severe.  Most pain relievers taken by mouth need at least 20-30 minutes to start working.  -Do not drive or drink alcohol while taking pain medicine.  -Pain medication can upset your stomach.  Taking them with a little food may help.  -Other ways to help control pain: elevation, ice, and relaxation  -Call your surgeon if still having unmanageable pain an hour after taking pain medicine.  -Pain medicine can cause constipation.  Taking an over-the counter stool softener while on prescription pain medicine and drinking plenty of fluids can prevent this side effect.  -Call your surgeon if you have severe side effects like: breathing problems, trouble waking up, dizziness, confusion, or severe constipation.    NAUSEA  -Some people have nausea after surgery.  This is often because of anesthesia, pain, pain medicine, or the stress of surgery.  -Do not push yourself to eat.  Start off with clear liquids and soup.  Slowly move to solid foods.  Don't eat fatty, rich, spicy foods at first.  Eat smaller amounts.  -If you develop persistent nausea and vomiting please notify your surgeon immediately.    BLEEDING  -Different types of surgery require different types of care and dressing  changes.  It is important to follow all instructions and advice from your surgeon.  Change dressing as directed.  Call your surgeon for any concerns regarding postop bleeding.    SIGNS OF INFECTION  -Signs of infection include: fever, swelling, drainage, and redness  -Notify your surgeon if you have a fever of 100.4 F (38.0 C) or higher.  -Notify your surgeon if you notice redness, swelling, increased pain, pus, or a foul smell at the incision site.

## 2025-02-10 ENCOUNTER — HOSPITAL ENCOUNTER (OUTPATIENT)
Facility: HOSPITAL | Age: 75
Discharge: HOME | End: 2025-02-10
Attending: ORTHOPAEDIC SURGERY | Admitting: ORTHOPAEDIC SURGERY
Payer: MEDICARE

## 2025-02-10 ENCOUNTER — ANESTHESIA (OUTPATIENT)
Dept: SURGERY | Facility: HOSPITAL | Age: 75
End: 2025-02-10
Payer: MEDICARE

## 2025-02-10 VITALS
DIASTOLIC BLOOD PRESSURE: 73 MMHG | TEMPERATURE: 98 F | BODY MASS INDEX: 29.27 KG/M2 | RESPIRATION RATE: 18 BRPM | OXYGEN SATURATION: 96 % | WEIGHT: 155 LBS | HEIGHT: 61 IN | HEART RATE: 83 BPM | SYSTOLIC BLOOD PRESSURE: 141 MMHG

## 2025-02-10 DIAGNOSIS — M25.562 LEFT KNEE PAIN: ICD-10-CM

## 2025-02-10 DIAGNOSIS — M15.0 PRIMARY OSTEOARTHRITIS INVOLVING MULTIPLE JOINTS: Primary | ICD-10-CM

## 2025-02-10 PROCEDURE — 63600175 PHARM REV CODE 636 W HCPCS: Mod: HCWC

## 2025-02-10 PROCEDURE — 29880 ARTHRS KNE SRG MNISECTMY M&L: CPT | Mod: HCWC,LT,, | Performed by: ORTHOPAEDIC SURGERY

## 2025-02-10 PROCEDURE — 37000009 HC ANESTHESIA EA ADD 15 MINS: Mod: HCWC | Performed by: ORTHOPAEDIC SURGERY

## 2025-02-10 PROCEDURE — 25000003 PHARM REV CODE 250: Mod: HCWC

## 2025-02-10 PROCEDURE — 36000710: Mod: HCWC | Performed by: ORTHOPAEDIC SURGERY

## 2025-02-10 PROCEDURE — 71000015 HC POSTOP RECOV 1ST HR: Mod: HCWC | Performed by: ORTHOPAEDIC SURGERY

## 2025-02-10 PROCEDURE — 25000003 PHARM REV CODE 250: Mod: HCWC | Performed by: ORTHOPAEDIC SURGERY

## 2025-02-10 PROCEDURE — 71000039 HC RECOVERY, EACH ADD'L HOUR: Mod: HCWC | Performed by: ORTHOPAEDIC SURGERY

## 2025-02-10 PROCEDURE — 63600175 PHARM REV CODE 636 W HCPCS: Mod: JZ,TB,HCWC | Performed by: ORTHOPAEDIC SURGERY

## 2025-02-10 PROCEDURE — 71000033 HC RECOVERY, INTIAL HOUR: Mod: HCWC | Performed by: ORTHOPAEDIC SURGERY

## 2025-02-10 PROCEDURE — 36000711: Mod: HCWC | Performed by: ORTHOPAEDIC SURGERY

## 2025-02-10 PROCEDURE — 27201423 OPTIME MED/SURG SUP & DEVICES STERILE SUPPLY: Mod: HCWC | Performed by: ORTHOPAEDIC SURGERY

## 2025-02-10 PROCEDURE — 37000008 HC ANESTHESIA 1ST 15 MINUTES: Mod: HCWC | Performed by: ORTHOPAEDIC SURGERY

## 2025-02-10 PROCEDURE — 71000016 HC POSTOP RECOV ADDL HR: Mod: HCWC | Performed by: ORTHOPAEDIC SURGERY

## 2025-02-10 RX ORDER — HYDROMORPHONE HYDROCHLORIDE 2 MG/ML
0.2 INJECTION, SOLUTION INTRAMUSCULAR; INTRAVENOUS; SUBCUTANEOUS EVERY 5 MIN PRN
Status: DISCONTINUED | OUTPATIENT
Start: 2025-02-10 | End: 2025-02-10 | Stop reason: HOSPADM

## 2025-02-10 RX ORDER — KETOROLAC TROMETHAMINE 30 MG/ML
15 INJECTION, SOLUTION INTRAMUSCULAR; INTRAVENOUS ONCE
Status: COMPLETED | OUTPATIENT
Start: 2025-02-10 | End: 2025-02-10

## 2025-02-10 RX ORDER — GLUCAGON 1 MG
1 KIT INJECTION
Status: DISCONTINUED | OUTPATIENT
Start: 2025-02-10 | End: 2025-02-10 | Stop reason: HOSPADM

## 2025-02-10 RX ORDER — CEFAZOLIN 2 G/1
2 INJECTION, POWDER, FOR SOLUTION INTRAMUSCULAR; INTRAVENOUS ONCE
Status: DISCONTINUED | OUTPATIENT
Start: 2025-02-10 | End: 2025-02-10 | Stop reason: HOSPADM

## 2025-02-10 RX ORDER — ACETAMINOPHEN 10 MG/ML
INJECTION, SOLUTION INTRAVENOUS
Status: DISCONTINUED | OUTPATIENT
Start: 2025-02-10 | End: 2025-02-10

## 2025-02-10 RX ORDER — PROCHLORPERAZINE EDISYLATE 5 MG/ML
5 INJECTION INTRAMUSCULAR; INTRAVENOUS EVERY 30 MIN PRN
Status: DISCONTINUED | OUTPATIENT
Start: 2025-02-10 | End: 2025-02-10 | Stop reason: HOSPADM

## 2025-02-10 RX ORDER — HYDROCODONE BITARTRATE AND ACETAMINOPHEN 5; 325 MG/1; MG/1
1 TABLET ORAL
Status: DISCONTINUED | OUTPATIENT
Start: 2025-02-10 | End: 2025-02-10 | Stop reason: HOSPADM

## 2025-02-10 RX ORDER — DEXAMETHASONE SODIUM PHOSPHATE 4 MG/ML
INJECTION, SOLUTION INTRA-ARTICULAR; INTRALESIONAL; INTRAMUSCULAR; INTRAVENOUS; SOFT TISSUE
Status: DISCONTINUED | OUTPATIENT
Start: 2025-02-10 | End: 2025-02-10

## 2025-02-10 RX ORDER — OXYCODONE HYDROCHLORIDE 5 MG/1
5 TABLET ORAL ONCE
Status: DISCONTINUED | OUTPATIENT
Start: 2025-02-10 | End: 2025-02-10

## 2025-02-10 RX ORDER — LIDOCAINE HYDROCHLORIDE 20 MG/ML
INJECTION, SOLUTION EPIDURAL; INFILTRATION; INTRACAUDAL; PERINEURAL
Status: DISCONTINUED | OUTPATIENT
Start: 2025-02-10 | End: 2025-02-10

## 2025-02-10 RX ORDER — BUPIVACAINE HYDROCHLORIDE 2.5 MG/ML
INJECTION, SOLUTION INFILTRATION; PERINEURAL
Status: DISCONTINUED | OUTPATIENT
Start: 2025-02-10 | End: 2025-02-10 | Stop reason: HOSPADM

## 2025-02-10 RX ORDER — SODIUM CHLORIDE 0.9 % (FLUSH) 0.9 %
10 SYRINGE (ML) INJECTION
Status: DISCONTINUED | OUTPATIENT
Start: 2025-02-10 | End: 2025-02-10 | Stop reason: HOSPADM

## 2025-02-10 RX ORDER — FENTANYL CITRATE 50 UG/ML
INJECTION, SOLUTION INTRAMUSCULAR; INTRAVENOUS
Status: DISCONTINUED | OUTPATIENT
Start: 2025-02-10 | End: 2025-02-10

## 2025-02-10 RX ORDER — CEFAZOLIN SODIUM 1 G/3ML
INJECTION, POWDER, FOR SOLUTION INTRAMUSCULAR; INTRAVENOUS
Status: DISCONTINUED | OUTPATIENT
Start: 2025-02-10 | End: 2025-02-10

## 2025-02-10 RX ORDER — EPINEPHRINE 1 MG/ML
INJECTION, SOLUTION, CONCENTRATE INTRAVENOUS
Status: DISCONTINUED | OUTPATIENT
Start: 2025-02-10 | End: 2025-02-10 | Stop reason: HOSPADM

## 2025-02-10 RX ORDER — ONDANSETRON 8 MG/1
8 TABLET, ORALLY DISINTEGRATING ORAL ONCE
Status: COMPLETED | OUTPATIENT
Start: 2025-02-10 | End: 2025-02-10

## 2025-02-10 RX ORDER — ONDANSETRON HYDROCHLORIDE 2 MG/ML
INJECTION, SOLUTION INTRAVENOUS
Status: DISCONTINUED | OUTPATIENT
Start: 2025-02-10 | End: 2025-02-10

## 2025-02-10 RX ORDER — ACETAMINOPHEN 325 MG/1
325 TABLET ORAL EVERY 4 HOURS
Qty: 84 TABLET | Refills: 0 | Status: SHIPPED | OUTPATIENT
Start: 2025-02-10 | End: 2025-02-24

## 2025-02-10 RX ORDER — DICLOFENAC SODIUM 75 MG/1
75 TABLET, DELAYED RELEASE ORAL 2 TIMES DAILY
Qty: 84 TABLET | Refills: 0 | Status: SHIPPED | OUTPATIENT
Start: 2025-02-10 | End: 2025-03-24

## 2025-02-10 RX ORDER — PROPOFOL 10 MG/ML
VIAL (ML) INTRAVENOUS
Status: DISCONTINUED | OUTPATIENT
Start: 2025-02-10 | End: 2025-02-10

## 2025-02-10 RX ADMIN — PROPOFOL 170 MG: 10 INJECTION, EMULSION INTRAVENOUS at 07:02

## 2025-02-10 RX ADMIN — HYDROCODONE BITARTRATE AND ACETAMINOPHEN 1 TABLET: 5; 325 TABLET ORAL at 08:02

## 2025-02-10 RX ADMIN — ONDANSETRON 4 MG: 2 INJECTION INTRAMUSCULAR; INTRAVENOUS at 07:02

## 2025-02-10 RX ADMIN — DEXAMETHASONE SODIUM PHOSPHATE 4 MG: 4 INJECTION, SOLUTION INTRA-ARTICULAR; INTRALESIONAL; INTRAMUSCULAR; INTRAVENOUS; SOFT TISSUE at 07:02

## 2025-02-10 RX ADMIN — KETOROLAC TROMETHAMINE 15 MG: 30 INJECTION, SOLUTION INTRAMUSCULAR; INTRAVENOUS at 09:02

## 2025-02-10 RX ADMIN — CEFAZOLIN 2 G: 330 INJECTION, POWDER, FOR SOLUTION INTRAMUSCULAR; INTRAVENOUS at 07:02

## 2025-02-10 RX ADMIN — LIDOCAINE HYDROCHLORIDE 100 MG: 20 INJECTION, SOLUTION EPIDURAL; INFILTRATION; INTRACAUDAL; PERINEURAL at 07:02

## 2025-02-10 RX ADMIN — FENTANYL CITRATE 50 MCG: 50 INJECTION INTRAMUSCULAR; INTRAVENOUS at 07:02

## 2025-02-10 RX ADMIN — ONDANSETRON 8 MG: 8 TABLET, ORALLY DISINTEGRATING ORAL at 10:02

## 2025-02-10 RX ADMIN — SODIUM CHLORIDE: 0.9 INJECTION, SOLUTION INTRAVENOUS at 07:02

## 2025-02-10 RX ADMIN — ACETAMINOPHEN 1000 MG: 10 INJECTION, SOLUTION INTRAVENOUS at 07:02

## 2025-02-10 NOTE — ANESTHESIA PROCEDURE NOTES
Intubation    Date/Time: 2/10/2025 7:06 AM    Performed by: Ray Malone DO  Authorized by: Enoch Reyes MD    Intubation:     Induction:  Intravenous    Intubated:  Postinduction    Mask Ventilation:  Not attempted    Attempts:  1    Attempted By:  Resident anesthesiologist    Method of Intubation:  Fast track LMA    Difficult Airway Encountered?: No      Complications:  None    Airway Device:  Supraglottic airway/LMA    Style/Cuff Inflation:  Uncuffed    Secured at:  The lips    Placement Verified By:  Capnometry    Complicating Factors:  None    Findings Post-Intubation:  BS equal bilateral and atraumatic/condition of teeth unchanged

## 2025-02-10 NOTE — PLAN OF CARE
Patient oob and urinated, vss, pain tolerable, patient ambulated with little assistance, nausea noted will give zofran

## 2025-02-10 NOTE — ANESTHESIA POSTPROCEDURE EVALUATION
Anesthesia Post Evaluation    Patient: Shobha Gutierrez    Procedure(s) Performed: Procedure(s) (LRB):  ARTHROSCOPY, KNEE, WITH MEDIAL OR LATERAL MENISCECTOMY (Left)    Final Anesthesia Type: general      Patient location during evaluation: PACU  Patient participation: Yes- Able to Participate  Level of consciousness: awake and alert  Post-procedure vital signs: reviewed and stable  Pain management: adequate  Airway patency: patent    PONV status at discharge: No PONV  Anesthetic complications: no      Cardiovascular status: blood pressure returned to baseline  Respiratory status: unassisted  Hydration status: euvolemic            Vitals Value Taken Time   /72 02/10/25 0847   Temp 36.6 °C (97.9 °F) 02/10/25 0750   Pulse 88 02/10/25 0847   Resp 13 02/10/25 0847   SpO2 94 % 02/10/25 0847   Vitals shown include unfiled device data.      No case tracking events are documented in the log.      Pain/Gamal Score: Gamal Score: 9 (2/10/2025  8:25 AM)

## 2025-02-10 NOTE — TRANSFER OF CARE
"Anesthesia Transfer of Care Note    Patient: Shobha Gutierrez    Procedure(s) Performed: Procedure(s) (LRB):  ARTHROSCOPY, KNEE, WITH MEDIAL OR LATERAL MENISCECTOMY (Left)    Patient location: PACU    Anesthesia Type: general    Transport from OR: Transported from OR on room air with adequate spontaneous ventilation    Post pain: adequate analgesia    Post assessment: no apparent anesthetic complications    Post vital signs: stable    Level of consciousness: awake    Nausea/Vomiting: no nausea/vomiting    Complications: none    Transfer of care protocol was followed      Last vitals: Visit Vitals  BP (!) 140/85 (BP Location: Right arm, Patient Position: Lying)   Pulse 93   Temp 36.8 °C (98.2 °F) (Tympanic)   Resp 16   Ht 5' 1" (1.549 m)   Wt 70.3 kg (155 lb)   LMP 12/22/1984   SpO2 95%   Breastfeeding No   BMI 29.29 kg/m²     "

## 2025-02-10 NOTE — PLAN OF CARE
0910  PACU discharge criteria met. VSS, awake, alert and oriented x 4. Drsg CDI. Report given to Matthieu MAGALLON RN. Pt. To room 220 via stretcher.

## 2025-02-10 NOTE — H&P
Ortho H&P Note    Patient is 74F indicated for LEFT knee arthroscopy with partial meniscectomy.    I have seen and examined the patient. No changes since last clinic visit.    Consented and marked.    Alan Manzano MD  [facility]    _______________________________________________    Patient ID: Shobha Gutierrez is a 74 y.o. female.     Chief Complaint: Pain of the Left Knee     Knee Pain: left  swelling: Yes  worsens with activity: Yes  relieved by: nsaid's  Mri shows men tear              Social History           Occupational History    Not on file   Tobacco Use    Smoking status: Never    Smokeless tobacco: Not on file   Substance and Sexual Activity    Alcohol use: No    Drug use: No    Sexual activity: Not on file      Social Drivers of Health           Tobacco Use: Unknown (1/9/2025)     Patient History      Smoking Tobacco Use: Never      Smokeless Tobacco Use: Unknown      Passive Exposure: Not on file   Alcohol Use: Not At Risk (4/24/2023)     Received from [facility]     AUDIT-C      Frequency of Alcohol Consumption: Never      Average Number of Drinks: Not on file      Frequency of Binge Drinking: Not on file   Financial Resource Strain: Low Risk  (4/24/2023)     Received from [facility]     Overall Financial Resource Strain (CARDIA)      Difficulty of Paying Living Expenses: Not hard at all   Food Insecurity: No Food Insecurity (4/24/2023)     Received from [facility]     Hunger Vital Sign      Worried About Running Out of Food in the Last Year: Never true      Ran Out of Food in the Last Year: Never true   Transportation Needs: No Transportation Needs (4/24/2023)     Received from [facility]     PRAPARE - Transportation      Lack of Transportation (Medical): No      Lack of Transportation (Non-Medical): No   Physical Activity: Unknown (4/24/2023)     Received from [facility]     Exercise Vital Sign      Days of Exercise per Week: 0 days      Minutes of Exercise per Session: Not on file   Stress: Stress  Concern Present (2023)     Received from [facility] - Occupational Stress Questionnaire      Feeling of Stress : Very much   Housing Stability: High Risk (2023)     [facility]      Think about the place you live. Do you have problems with:  Choose all that apply.: Mold      What is your living situation today?: I have a steady place to live   Depression: Moderately severe depression (2023)     Received from [facility]     PHQ-9      PHQ-9 Total Score - If Score > 5, Proceed to Suicide Risk Screenin   Utilities: Not on file   Health Literacy: Not on file   Social Isolation: Not on file      Review of Systems   Constitutional: Negative for diaphoresis.   HENT:  Negative for ear discharge, nosebleeds and stridor.    Eyes:  Negative for photophobia.   Cardiovascular:  Negative for syncope.   Respiratory:  Negative for hemoptysis, shortness of breath and wheezing.    Neurological:  Negative for tremors.   Psychiatric/Behavioral: Negative.              Objective:      Objective  General     Constitutional: She is oriented to person, place, and time. She appears well-developed and well-nourished.   HENT:   Head: Normocephalic and atraumatic.   Right Ear: External ear normal.   Left Ear: External ear normal.   Eyes: EOM are normal.   Pulmonary/Chest: Effort normal.   Neurological: She is alert and oriented to person, place, and time.   Psychiatric: She has a normal mood and affect. Her behavior is normal. Judgment and thought content normal.      General Musculoskeletal Exam   Gait: antalgic and abnormal            Left Knee Exam      Inspection   Swelling: present  Effusion: present     Tenderness   The patient tender to palpation of the medial joint line.     Crepitus   The patient has crepitus of the patella.     Other   Sensation: normal     Muscle Strength   Left Lower Extremity   Quadriceps:  4/5   Hamstrin/5             Assessment:      Assessment  1. Tear of medial meniscus of left knee, subsequent encounter    2. Acute pain of left knee             Plan:   The patient presents today for followup. MRI revealed a tear of the meniscus as well as some degenerative arthritis of the Left knee. AP knee Xray shows >50% maintenance of joint space (ie KL 2). We discussed these findings with the patient. We did discuss arthroscopy and the fact that its role would hopefully help the symptoms related to meniscal tear; however, would not necessarily address any symptoms related to degenerative arthritis. The patient was also offered a course of PT first but would rather move forward with arthroscopy. The patient understands this and would like to move forward. I think that is reasonable. We reviewed risks and benefits of surgery. We also reviewed the role of physical therapy vs arthroscopy. Research indicates that approx 9 mos of PT will achieve similar results to the near immediate improvement with arthroscopy. The patient would like the faster improvement compared to the delayed improvement with PT. We will go ahead and schedule this at a time that is convenient for the patient.

## 2025-02-10 NOTE — BRIEF OP NOTE
[facility]  Brief Operative Note    Surgery Date:  2/10/2025    Surgeon(s) and Role:     Ruiz Live MD - Primary    Assisting Surgeon:  Alan Rico MD    Pre-op Diagnosis: Left Knee Meniscus Tear    Post-op Diagnosis:    Post-Op Diagnosis Codes:     * Tear of medial meniscus of left knee, subsequent encounter [S83.242D]     * Acute pain of left knee [M25.562]    Procedure(s) (LRB):  ARTHROSCOPY, KNEE, WITH MEDIAL OR LATERAL MENISCECTOMY (Left)    Anesthesia: Choice    Operative Findings:  Patient arrived this morning for elective left knee arthroscopy with possible partial meniscectomy.  Patient underwent the procedure without complication and recovered appropriately in the recovery unit.  Patient pain scripts were printed out.  When the patient was mobilized and deemed safe to go home she was discharged.    Estimated Blood Loss: see full op note          Specimens:   Specimen (24h ago, onward)      None          Discharge Note    OUTCOME: Patient tolerated treatment/procedure well without complication and is now ready for discharge.    DISPOSITION: Home or Self Care    FINAL DIAGNOSIS: Left Knee Meniscus Tear     FOLLOWUP: In clinic    DISCHARGE INSTRUCTIONS: See handout

## 2025-02-10 NOTE — OP NOTE
2/10/2025    Pre op dx:  Left knee meniscus tear    Post op dx:  Left knee medial and lateral meniscus tear    Procedure:  Left knee arthroscopic partial medial and lateral meniscectomy    Attending Surgeon: Ruiz Live MD    Assistants: dr ansari and jorge    Complications: none    Estimated bood loss: < 20cc    Implants:  None    Indication:  This 74-year-old female with mechanical knee pain.  MRI revealed tear of the meniscus.  She had failed nonsurgical management including injections and therapy.  We discussed the benefits and risks of arthroscopy.  She understood that arthroscopy should hopefully help her symptoms related to meniscus tear however may not necessarily address any symptoms related to degenerative arthritis.  She understood this and agreed move forward with arthroscopy    Findings:  Grade 2 fissuring patella and trochlea.  Patella tracking slightly laterally within trochlear groove.  Grade 1 changes medial femoral condyle and grade 2 medial tibial plateau.  Grade 1 softening lateral femoral condyle and grade 2 fissuring lateral tibial plateau    Procedure:  The patient is brought to operating room placed supine operative table.  General anesthesia was started without any difficulties.  Left knee was placed in leg wagner and foot table flexed.  Posterior aspect right knee was well padded.  Prior to incision proper site and procedures well as antibiotic administration was verified.  Anterolateral and anteromedial portal sites were injected Marcaine.  Eleven blade was used to establish the anterolateral portal this was then dilating trocar and cannula and camera placed in oleary undersurface of the patella was visualized.  Knee was then flexed medial and medial compartment visualized.  Spinal needle was used to establish the anteromedial portal.  This was then created using 11 blade and dilated using a trocar.  Medial meniscus was probed which showed a complex degenerative horizontal cleavage tear.   This was debrided to a stable rim using a shaver of the bottom leaf.  Knee was then flexed ACL visualized.  This was intact with no tears or disruptions.  Knee was then placed in figure 4 position lateral compartment probed.  Lateral meniscus had significant radial tearing and free edge fraying at the midbody posterior horn junction.  This was debrided to a stable rim using a shaver.  Cannula was then placed back in suprapatellar pouch and all excess fluid evacuated the cannula.  Portal sites were then closed Dermabond Steri-Strips injected Marcaine.  Incision was then dressed with sterile ABD and Ace wrap.  Patient was then extubated by anesthesia and transferred to recovery room bed in stable condition.            Note dictated with voice recognition software, please excuse any grammatical errors.

## 2025-02-11 ENCOUNTER — CLINICAL SUPPORT (OUTPATIENT)
Dept: REHABILITATION | Facility: HOSPITAL | Age: 75
End: 2025-02-11
Attending: ORTHOPAEDIC SURGERY
Payer: MEDICARE

## 2025-02-11 DIAGNOSIS — M25.662 DECREASED RANGE OF MOTION (ROM) OF LEFT KNEE: Primary | ICD-10-CM

## 2025-02-11 DIAGNOSIS — M25.562 ACUTE PAIN OF LEFT KNEE: ICD-10-CM

## 2025-02-11 DIAGNOSIS — R29.898 DECREASED STRENGTH OF LOWER EXTREMITY: ICD-10-CM

## 2025-02-11 DIAGNOSIS — S83.242D TEAR OF MEDIAL MENISCUS OF LEFT KNEE, SUBSEQUENT ENCOUNTER: ICD-10-CM

## 2025-02-11 PROCEDURE — 97110 THERAPEUTIC EXERCISES: CPT | Mod: HCWC,PN

## 2025-02-11 PROCEDURE — 97161 PT EVAL LOW COMPLEX 20 MIN: CPT | Mod: HCWC,PN

## 2025-02-11 NOTE — PROGRESS NOTES
"  Outpatient Rehab    Physical Therapy Evaluation    Patient Name: Shobha Gutierrez  MRN: [id number]  YOB: 1950  Today's Date: 2/11/2025    Therapy Diagnosis:   Encounter Diagnoses   Name Primary?    Tear of medial meniscus of left knee, subsequent encounter     Acute pain of left knee     Decreased range of motion (ROM) of left knee Yes    Decreased strength of lower extremity      Physician: Ruiz Live MD    Physician Orders: Eval and Treat  Medical Diagnosis: S83.242D (ICD-10-CM) - Tear of medial meniscus of left knee, subsequent encounter M25.562 (ICD-10-CM) - Acute pain of left knee.     Visit # / Visits Authorized:  1 / 1   Date of Evaluation:  2/11/2025   Insurance Authorization Period: 2/11/25 to 12/31/25  Plan of Care Certification:  2/11/2025 to 3/28/25      Time In: 1300   Time Out: 1353  Total Time: 53   Total Billable Time: 53    Intake Outcome Measure for FOTO Survey    Therapist reviewed FOTO scores for Shobha Gutierrez on 2/11/2025.   FOTO report - see Media section or FOTO account episode details.     Intake Score: 42%         Subjective   History of Present Illness  Shobha is a 74 y.o. female who reports to physical therapy with a chief concern of L knee pain. According to the patient's chart, Shobha has a past medical history of Coronary artery disease involving native coronary artery of native heart without angina pectoris, Hypertension, and Kidney stones. Shobha has a past surgical history that includes Hysterectomy and arthroscopy, knee, with medial or lateral meniscectomy (Left, 2/10/2025).    The patient reports a medical diagnosis of S83.242D (ICD-10-CM) - Tear of medial meniscus of left knee, subsequent encounter  M25.562 (ICD-10-CM) - Acute pain of left knee.  Patient reports a surgery of ARTHROSCOPY, KNEE, WITH MEDIAL OR LATERAL MENISCECTOMY (Left).  Diagnostic tests related to this condition: MRI studies.   MRI Studies Details: "FINDINGS:  The ACL and the PCL are intact as " "are the quadriceps and patellar tendons.  The MCL and the LCL are intact.  There is undersurface tear of the body and posterior horn of the medial meniscus.  The lateral meniscus is intact.  The patella demonstrates mild chondromalacia.  The patellar retinacula are intact.  There is a small joint effusion.  There is mild medial and lateral knee joint compartment DJD.  No fracture dislocation bone destruction seen.  No marrow replacement, marrow edema, infection, or neoplasm seen.  There is a small joint effusion.     Impression:     Internal derangement as above."    History of Present Condition/Illness: Patient presents POD 1 s/p  ARTHROSCOPY, KNEE, WITH MEDIAL OR LATERAL MENISCECTOMY (Left). She is walking without device.  Chronic knee pain prior to surgery limiting mobility. She reports no history of falls. She reports difficulty sleeping on sofa last night. She also has fibromyalgia, bells palsy, and history of heart issues/hypertension which she monitors daily. She uses ice daily. She has not been elevating. She reports a lot of swelling prior to surgery. She has bandage donned. Pain with all daily activities today related to left knee.     Activities of Daily Living  Social history was obtained from Patient.    General Prior Level of Function Comments: independent with ADLs  General Current Level of Function Comments: pain with ADLs  Patient Responsibilities: Driving, Home management, Laundry, Meal prep, Personal ADL, Shopping, Community mobility, Health management    Previously independent with activities of daily living? Yes     Currently independent with activities of daily living? No  Activities currently needing assistance include Bathing, Dressing - lower body, and Functional mobility.        Previously independent with instrumental activities of daily living? Yes     Currently independent with instrumental activities of daily living? Yes     Pain with ADLs         Pain     Patient reports a current pain " level of 8/10. Pain at best is reported as 7/10. Pain at worst is reported as 9/10.   Location: L knee  Clinical Progression (since onset): Stable  Pain Qualities: Aching, Sharp, Tightness  Pain-Relieving Factors: Ice, Medications - prescription  Pain-Aggravating Factors: Bending, Sitting, Squatting, Standing, Walking         Treatment History  Treatments  Previously Received Treatments: No  Currently Receiving Treatments: No    Living Arrangements  Living Situation  Housing: Home independently  Living Arrangements: Family members, Spouse/significant other  Support Systems: Family members    Home Setup  Type of Structure: House  Home Access: Stairs with rails  Number of Levels in Home: Two level  Patient is able to live on main floor of home: Yes  Primary Bedroom: 1st floor  Bathroom Toilet: Raised        Employment  Patient does not report that: Does the patient's condition impact their ability to work?      Retired        Past Medical History/Physical Systems Review:   Shobha Gutierrez  has a past medical history of Coronary artery disease involving native coronary artery of native heart without angina pectoris, Hypertension, and Kidney stones.    Shobha Gutierrez  has a past surgical history that includes Hysterectomy and arthroscopy, knee, with medial or lateral meniscectomy (Left, 2/10/2025).    Shobha has a current medication list which includes the following prescription(s): acetaminophen, albuterol, amitriptyline, amlodipine, benazepril, diclofenac, lisinopril, pantoprazole, polyethylene glycol, and phenazopyridine.    Review of patient's allergies indicates:  No Known Allergies     Objective   Posture                 Left knee position is: Flexed       Knee Observations  Right Knee Observations  Present: Straight Leg Raise Extensor Lag  Left Knee Observations  Present: Edema, Effusion, Incision, and Straight Leg Raise Extensor Lag  Left Knee Incision Observations  Present: Clean and Dry  Not Present:  Drainage     Limited incision visibility secondary to bandages and pants       Knee Range of Motion   Right Knee   Active (deg) Passive (deg) Pain   Flexion 127 129     Extension 0 0         Left Knee   Active (deg) Passive (deg) Pain   Flexion 80 90 Yes   Extension 5 4 Yes                      Hip Strength - Planes of Motion   Right Strength Right Pain Left Strength Left  Pain   Flexion (L2) 4+   4+ Yes   Extension           ABduction           ADduction           Internal Rotation           External Rotation               Knee Strength   Right Strength Right Pain Left Strength Left  Pain   Flexion (S2)  (9.3kg)    (5.4kg) Yes   Prone Flexion           Extension (L3)  (9.3kg)    (6.8kg)       Knee Extensor Lag  Lag Present: Right and Left       Ankle/Foot Strength - Planes of Motion   Right Strength Right Pain Left Strength Left  Pain   Dorsiflexion (L4) 5   5     Plantar Flexion (S1)           Inversion           Eversion           Great Toe Flexion           Great Toe Extension (L5)           Lesser Toes Flexion           Lesser Toes Extension                  Knee Patellar Screening       Right Patellar Mobility  Hypomobile: Medial, Lateral, Superior, and Inferior  Left Patellar Mobility  Hypomobile: Medial, Lateral, Superior, and Inferior           Bed Mobility Assessment  Rolling Assistance: Independent  Sidelying to Sit Assistance: Minimal assist      Timed Up & Go (TUG)  Time: 33.46 seconds  Observations: Slow tentative pace  An older adult who takes >=12 seconds to complete the TUG is at risk for falling.       Sit to Stand Testing      The patient completed 9 repetitions of a sit to stand transfer in 30 seconds. use of Bilateral UEs          Gait Analysis  Gait Pattern: Antalgic  Walking Speed: Decreased         Left Side Walking Observations  Increased: Swing Time  Decreased: Stance Time and Step Length     Knee Observations During Gait  Left: Decreased Knee Flexion in Swing, Decreased Knee Extension in  "Initial Contact, and Knee Decreased Extension in Midstance         Treatment:  Therapeutic Exercise  Therapeutic Exercise Activity 1: quad set 10x  Therapeutic Exercise Activity 2: seated heel slides 10x  Therapeutic Exercise Activity 3: hamstring stretch 10"  Therapeutic Exercise Activity 4: heel prop 5'    Assessment & Plan   Assessment  Shobha presents with a condition of Low complexity.   Presentation of Symptoms: Stable  Will Comorbidities Impact Care: Yes       Functional Limitations: Activity tolerance, Ambulating on uneven surfaces, Bed mobility, Completing self-care activities, Decreased ambulation distance/endurance, Disrupted sleep pattern, Functional mobility, Gait limitations, Getting off the floor, Maintaining balance, Pain with ADLs/IADLs, Painful locomotion/ambulation, Participating in leisure activities, Performing household chores, Range of motion, Squatting, Sitting tolerance, Standing tolerance  Impairments: Impaired physical strength, Lack of appropriate home exercise program, Pain with functional activity, Abnormal or restricted range of motion, Activity intolerance, Abnormal gait  Personal Factors Affecting Prognosis: Pain    Patient Goal for Therapy (PT): improve walking  Prognosis: Good  Assessment Details: Patient presents with a medical diagnosis of S83.242D (ICD-10-CM) - Tear of medial meniscus of left knee, subsequent encounter M25.562 (ICD-10-CM) - Acute pain of left knee. Patient presents POD 1 s/p  Left knee arthroscopic partial medial and lateral meniscectomy. Patient presents without assistive device ambulating with antalgic painful gait. Recommended use of single point cane in this early stage to offload knee. Patient demonstrates decreased knee range of motion, decreased lower extremity strength, impaired balance and gait, and decreased functional mobility. Overall knee range of motion good postoperatively, lacking TKE. Quad set fair and straight leg raise with lag but able to " perform independently. Patient initially hesitant about benefit of PT, however verbalized understanding of role of PT and benefits for early recovery post-op by end of evaluation. Patient will benefit from skilled PT to address the above deficits.    Plan  From a physical therapy perspective, the patient would benefit from: Skilled Rehab Services    Planned therapy interventions include: Therapeutic exercise, Therapeutic activities, Neuromuscular re-education, Manual therapy, and Gait training.    Planned modalities to include: Cryotherapy (cold pack), Low-level laser therapy, Thermotherapy (hot pack), Electrical stimulation - attended, and Electrical stimulation - passive/unattended.        Visit Frequency: 2 times Per Week for 6 Weeks.       This plan was discussed with Patient and Therapy assistant.   Discussion participants: Agreed Upon Plan of Care             Patient's spiritual, cultural, and educational needs considered and patient agreeable to plan of care and goals.     Education  Education was done with Patient. The patient's learning style includes Demonstration and Pictures/video. The patient Demonstrates understanding.                 Goals:   Active       Ambulation/movement       Patient will walk 500ft without assistive device without gait deficits       Start:  02/11/25    Expected End:  03/28/25               Functional outcome       Patient will show a significant change in KOOS Jr patient-reported outcome tool to demonstrate subjective improvement       Start:  02/11/25    Expected End:  03/28/25            Patient will demonstrate independence in home program for support of progression       Start:  02/11/25    Expected End:  03/07/25               Pain       Patient will report pain of 4/10 demonstrating a reduction of overall pain       Start:  02/11/25    Expected End:  03/28/25               Range of Motion       Patient will achieve left knee ROM of  0-120 degrees        Start:  02/11/25     Expected End:  03/07/25               Strength       Patient will improve left knee flexion strength by 5kg       Start:  02/11/25    Expected End:  03/28/25            Patient will improve left knee extension strength by 5kg       Start:  02/11/25    Expected End:  03/28/25               Transferring       Patient will transfer from sit to stand without upper extremity assistance 8x       Start:  02/11/25    Expected End:  03/28/25                Raven Lopez PT, DPT

## 2025-02-14 ENCOUNTER — DOCUMENTATION ONLY (OUTPATIENT)
Dept: REHABILITATION | Facility: HOSPITAL | Age: 75
End: 2025-02-14
Payer: MEDICARE

## 2025-02-14 NOTE — PROGRESS NOTES
Physical therapist and physical therapy assistant(s) met face to face to discuss patient's treatment plan and progress towards established goals. Pt will be seen by a physical therapist minimally every 6th visit or every 30 days.    KELY RODRIGEZ, PT, DPT

## 2025-02-18 ENCOUNTER — CLINICAL SUPPORT (OUTPATIENT)
Dept: REHABILITATION | Facility: HOSPITAL | Age: 75
End: 2025-02-18
Payer: MEDICARE

## 2025-02-18 DIAGNOSIS — R29.898 DECREASED STRENGTH OF LOWER EXTREMITY: ICD-10-CM

## 2025-02-18 DIAGNOSIS — M25.662 DECREASED RANGE OF MOTION (ROM) OF LEFT KNEE: Primary | ICD-10-CM

## 2025-02-18 PROCEDURE — 97112 NEUROMUSCULAR REEDUCATION: CPT | Mod: HCWC,PN

## 2025-02-18 PROCEDURE — 97110 THERAPEUTIC EXERCISES: CPT | Mod: HCWC,PN

## 2025-02-18 PROCEDURE — 97116 GAIT TRAINING THERAPY: CPT | Mod: HCWC,PN

## 2025-02-18 PROCEDURE — 97530 THERAPEUTIC ACTIVITIES: CPT | Mod: HCWC,PN

## 2025-02-18 NOTE — PROGRESS NOTES
"  Outpatient Rehab    Physical Therapy Visit    Patient Name: Shobha Gutierrez  MRN: [id number]  YOB: 1950  Today's Date: 2/18/2025    Therapy Diagnosis:   Encounter Diagnoses   Name Primary?    Decreased range of motion (ROM) of left knee Yes    Decreased strength of lower extremity      Physician: Ruiz Live MD      Physician Orders: Eval and Treat  Medical Diagnosis: S83.242D (ICD-10-CM) - Tear of medial meniscus of left knee, subsequent encounter M25.562 (ICD-10-CM) - Acute pain of left knee.      Visit # / Visits Authorized:  1 / 16   Date of Evaluation:  2/11/2025   Insurance Authorization Period: 2/11/25 to 12/31/25  Plan of Care Certification:  2/11/2025 to 3/28/25      Time In: 1400   Time Out: 1455  Total Time: 55   Total Billable Time: 55    FOTO:  Intake Score:  %  Survey Score 1:  %  Survey Score 2:  %         Subjective   she is feeling generalized pain from fibromyalgia flare up, attributes to weather. she is walking a few blocks daily. had a lot of pain last night..  Pain reported as 7/10. L knee    Objective       Knee Range of Motion   Left Knee   Active (deg) Passive (deg) Pain   Flexion 95 96 Yes   Extension 2 1 Yes                   Treatment:  Therapeutic Exercise  Therapeutic Exercise Activity 1: calf stretch heel propped 10x10"  Therapeutic Exercise Activity 2: seated heel slides 10x  Therapeutic Exercise Activity 3: hamstring stretch 10"  Therapeutic Exercise Activity 4: heel prop 3' 3#  Therapeutic Exercise Activity 5: knee flexion to step 2x10         Balance/Neuromuscular Re-Education  Balance/Neuromuscular Re-Education Activity 1: quad set 2x10 on bolster with heel pop  Balance/Neuromuscular Re-Education Activity 2: SLR L only min A 2x10- mod quad lag  Balance/Neuromuscular Re-Education Activity 3: LAQ 2x10    Therapeutic Activity  Therapeutic Activity 1: heel raises 2x10  Therapeutic Activity 2: mino step over (1 mino) lateral 10x  Therapeutic Activity 3: bridge " 10x    Gait Training  Gait Training Activity 1: clinic ambulation 150 ft heel strike focus    Assessment & Plan   Assessment: Patient presents with a medical diagnosis of S83.242D (ICD-10-CM) - Tear of medial meniscus of left knee, subsequent encounter M25.562 (ICD-10-CM) - Acute pain of left knee. Patient presents POD 7 s/p  Left knee arthroscopic partial medial and lateral meniscectomy 2/10/25. Patient presents without assistive device ambulating with antalgic painful gait. improving knee extension ROM as well as flexion. pt demonstrates mild TKE deficit during ambulation. included gait training with focus on heel strike. good quad set, min A with SLR. fatigued post treatment. continue to progress as tolerated.  Evaluation/Treatment Tolerance: Patient tolerated treatment well    Patient will continue to benefit from skilled outpatient physical therapy to address the deficits listed in the problem list box on initial evaluation, provide pt/family education and to maximize pt's level of independence in the home and community environment.     Patient's spiritual, cultural, and educational needs considered and patient agreeable to plan of care and goals.           Plan: continue poc    Goals:   Active       Ambulation/movement       Patient will walk 500ft without assistive device without gait deficits (Progressing)       Start:  02/11/25    Expected End:  03/28/25               Functional outcome       Patient will show a significant change in KOOS Jr patient-reported outcome tool to demonstrate subjective improvement (Progressing)       Start:  02/11/25    Expected End:  03/28/25            Patient will demonstrate independence in home program for support of progression (Progressing)       Start:  02/11/25    Expected End:  03/07/25               Pain       Patient will report pain of 4/10 demonstrating a reduction of overall pain (Progressing)       Start:  02/11/25    Expected End:  03/28/25               Range of  Motion       Patient will achieve left knee ROM of  0-120 degrees  (Progressing)       Start:  02/11/25    Expected End:  03/07/25               Strength       Patient will improve left knee flexion strength by 5kg (Progressing)       Start:  02/11/25    Expected End:  03/28/25            Patient will improve left knee extension strength by 5kg (Progressing)       Start:  02/11/25    Expected End:  03/28/25               Transferring       Patient will transfer from sit to stand without upper extremity assistance 8x (Progressing)       Start:  02/11/25    Expected End:  03/28/25                Raven Lopez PT, DPT

## 2025-04-18 ENCOUNTER — PATIENT MESSAGE (OUTPATIENT)
Dept: RESEARCH | Facility: OTHER | Age: 75
End: 2025-04-18
Payer: MEDICARE

## 2025-06-10 ENCOUNTER — OFFICE VISIT (OUTPATIENT)
Dept: ORTHOPEDICS | Facility: CLINIC | Age: 75
End: 2025-06-10
Payer: MEDICARE

## 2025-06-10 VITALS — WEIGHT: 155 LBS | BODY MASS INDEX: 29.27 KG/M2 | HEIGHT: 61 IN

## 2025-06-10 DIAGNOSIS — M17.12 PRIMARY OSTEOARTHRITIS OF LEFT KNEE: Primary | ICD-10-CM

## 2025-06-10 PROCEDURE — G2211 COMPLEX E/M VISIT ADD ON: HCPCS | Mod: S$GLB,,, | Performed by: ORTHOPAEDIC SURGERY

## 2025-06-10 PROCEDURE — 99999 PR PBB SHADOW E&M-EST. PATIENT-LVL III: CPT | Mod: PBBFAC,,, | Performed by: ORTHOPAEDIC SURGERY

## 2025-06-10 PROCEDURE — 3288F FALL RISK ASSESSMENT DOCD: CPT | Mod: CPTII,S$GLB,, | Performed by: ORTHOPAEDIC SURGERY

## 2025-06-10 PROCEDURE — 4010F ACE/ARB THERAPY RXD/TAKEN: CPT | Mod: CPTII,S$GLB,, | Performed by: ORTHOPAEDIC SURGERY

## 2025-06-10 PROCEDURE — 99213 OFFICE O/P EST LOW 20 MIN: CPT | Mod: S$GLB,,, | Performed by: ORTHOPAEDIC SURGERY

## 2025-06-10 PROCEDURE — 1125F AMNT PAIN NOTED PAIN PRSNT: CPT | Mod: CPTII,S$GLB,, | Performed by: ORTHOPAEDIC SURGERY

## 2025-06-10 PROCEDURE — 1159F MED LIST DOCD IN RCRD: CPT | Mod: CPTII,S$GLB,, | Performed by: ORTHOPAEDIC SURGERY

## 2025-06-10 PROCEDURE — 3008F BODY MASS INDEX DOCD: CPT | Mod: CPTII,S$GLB,, | Performed by: ORTHOPAEDIC SURGERY

## 2025-06-10 PROCEDURE — 1101F PT FALLS ASSESS-DOCD LE1/YR: CPT | Mod: CPTII,S$GLB,, | Performed by: ORTHOPAEDIC SURGERY

## 2025-06-10 NOTE — PROGRESS NOTES
[facility] [address]      Subjective:     Patient ID: Shobha Gutierrez is a 75 y.o. female.    Chief Complaint: Pain of the Left Knee    Patient is 4 months status post left knee arthroscopy with partial medial meniscectomy.    Patient's knee pain much improved with knee scope  No new complaints  Patient pleased with surgical outcome   Attended physical therapy        Past Medical History:   Diagnosis Date    Coronary artery disease involving native coronary artery of native heart without angina pectoris 11/2/2022    Hypertension     Kidney stones         Past Surgical History:   Procedure Laterality Date    ARTHROSCOPY, KNEE, WITH MEDIAL OR LATERAL MENISCECTOMY Left 2/10/2025    Procedure: ARTHROSCOPY, KNEE, WITH MEDIAL OR LATERAL MENISCECTOMY;  Surgeon: Ruiz Live MD;  Location: [facility] OR;  Service: Orthopedics;  Laterality: Left;    HYSTERECTOMY          Current Outpatient Medications   Medication Instructions    albuterol (PROVENTIL/VENTOLIN HFA) 90 mcg/actuation inhaler 2 puffs, 4 times daily PRN    amitriptyline (ELAVIL) 25 mg, Nightly PRN    amLODIPine (NORVASC) 10 mg, Daily    benazepriL (LOTENSIN) 20 mg, Daily    lisinopriL 10 mg, Daily    pantoprazole (PROTONIX) 40 mg, Daily    peg 3350-electrolytes-vit C (MOVIPREP) 100-7.5-2.691 gram PwPk As Directed    phenazopyridine (PYRIDIUM) 200 mg, 3 times daily        Review of patient's allergies indicates:  No Known Allergies    Social History[1]    Family History   Problem Relation Name Age of Onset    Diabetes Mother      Heart disease Father      Hypertension Sister      Hypertension Brother      Cancer Maternal Grandfather           Review of systems negative except for noted in HPI    Objective:   Physical Exam:     Left knee  Well healed arthroscopy portals  No effusion  Minimally Tender to palpation over medial joint line, minimally tender to palpation lateral joint line  ROM 5-105  Stable anterior/posterior   Stable varus/valgus  No groin pain with  rotation of hip  Grossly NVI distally    Imaging:  No new imaging      Assessment:      Shobha Gutierrez is a 75 y.o. female who is status post left knee arthroscopy with partial medial meniscectomy on 02/10/2025, doing well    No diagnosis found.    Plan :    Continue with normal activities  Continue with the exercises learned at physical therapy  We will plan to see patient back in clinic at 1 year appointment    No orders of the defined types were placed in this encounter.       Manish Knight MD   06/10/2025             [1]   Social History  Socioeconomic History    Marital status:    Tobacco Use    Smoking status: Never   Substance and Sexual Activity    Alcohol use: No    Drug use: No     Social Drivers of Health     Financial Resource Strain: Low Risk  (4/24/2023)    Received from [facility]    Overall Financial Resource Strain (CARDIA)     Difficulty of Paying Living Expenses: Not hard at all   Food Insecurity: No Food Insecurity (4/24/2023)    Received from [facility]    Hunger Vital Sign     Worried About Running Out of Food in the Last Year: Never true     Ran Out of Food in the Last Year: Never true   Transportation Needs: No Transportation Needs (4/24/2023)    Received from [facility]    PRAPARE - Transportation     Lack of Transportation (Medical): No     Lack of Transportation (Non-Medical): No   Physical Activity: Unknown (4/24/2023)    Received from [facility]    Exercise Vital Sign     Days of Exercise per Week: 0 days   Stress: Stress Concern Present (4/24/2023)    Received from [facility]    [employer] - Occupational Stress Questionnaire     Feeling of Stress : Very much   Housing Stability: High Risk (5/31/2023)    Received from [facility]     Think about the place you live. Do you have problems with:  Choose all that apply.: Mold     What is your living situation today?: I have a steady place to live

## (undated) DEVICE — GLOVE SENSICARE PI GRN 8

## (undated) DEVICE — TUBING SUC UNIV W/CONN 12FT

## (undated) DEVICE — CLOSURE SKIN STERI STRIP 1/2X4

## (undated) DEVICE — Device

## (undated) DEVICE — BNDG COFLEX FOAM LF2 ST 6X5YD

## (undated) DEVICE — SYR 30CC LUER LOCK

## (undated) DEVICE — PAD ABDOMINAL STERILE 5X9IN

## (undated) DEVICE — DURAPREP SURG SCRUB 26ML

## (undated) DEVICE — ALCOHOL 70% ISOP W/GREEN 16OZ

## (undated) DEVICE — TUBING 4-LEAD ARTHOSCOPY

## (undated) DEVICE — MANIFOLD 4 PORT

## (undated) DEVICE — DRAPE U SPLIT SHEET 54X76IN

## (undated) DEVICE — ADHESIVE DERMABOND ADVANCED

## (undated) DEVICE — BLADE SURG CARBON STEEL SZ11

## (undated) DEVICE — NDL HYPO STD REG BVL 18GX1.5IN

## (undated) DEVICE — GLOVE SENSICARE PI SURG 7.5

## (undated) DEVICE — COVER OVERHEAD SURG LT BLUE

## (undated) DEVICE — PACK EXTREMITY KENNER

## (undated) DEVICE — SOCKINETTE IMPERVIOUS 12X48IN

## (undated) DEVICE — TOWEL OR XRAY BLUE 17X26IN

## (undated) DEVICE — COVER PROXIMA MAYO STAND

## (undated) DEVICE — NDL SPINAL 18GX3.5 SPINOCAN

## (undated) DEVICE — SPONGE COTTON TRAY 4X4IN

## (undated) DEVICE — BANDAGE MATRIX HK LOOP 6IN 5YD